# Patient Record
Sex: MALE | URBAN - METROPOLITAN AREA
[De-identification: names, ages, dates, MRNs, and addresses within clinical notes are randomized per-mention and may not be internally consistent; named-entity substitution may affect disease eponyms.]

---

## 2020-09-04 ENCOUNTER — TELEPHONE (OUTPATIENT)
Dept: HEMATOLOGY/ONCOLOGY | Facility: CLINIC | Age: 70
End: 2020-09-04

## 2020-09-04 NOTE — TELEPHONE ENCOUNTER
Spoke with patient's emergency contact in regards to patient coming in for hospital follow up. She stated that the patient is not in any condition to come to the doctor. I let her know that the [facility] will [facility] due to [facility] and get a referral to see Dr. Almeida. She stated that she would contact the [facility] and see if they need the patient to come in and see them or what is the next step. She said to disregard the referral unless the [facility] advises her to bring the patient to Dr. Almeida.